# Patient Record
Sex: FEMALE | Race: WHITE | NOT HISPANIC OR LATINO | URBAN - METROPOLITAN AREA
[De-identification: names, ages, dates, MRNs, and addresses within clinical notes are randomized per-mention and may not be internally consistent; named-entity substitution may affect disease eponyms.]

---

## 2023-12-15 ENCOUNTER — APPOINTMENT (RX ONLY)
Dept: URBAN - METROPOLITAN AREA CLINIC 308 | Facility: CLINIC | Age: 68
Setting detail: DERMATOLOGY
End: 2023-12-15

## 2023-12-15 DIAGNOSIS — L57.0 ACTINIC KERATOSIS: ICD-10-CM

## 2023-12-15 DIAGNOSIS — I78.8 OTHER DISEASES OF CAPILLARIES: ICD-10-CM

## 2023-12-15 PROBLEM — D23.39 OTHER BENIGN NEOPLASM OF SKIN OF OTHER PARTS OF FACE: Status: ACTIVE | Noted: 2023-12-15

## 2023-12-15 PROCEDURE — 99203 OFFICE O/P NEW LOW 30 MIN: CPT | Mod: 25

## 2023-12-15 PROCEDURE — ? LIQUID NITROGEN

## 2023-12-15 PROCEDURE — 17000 DESTRUCT PREMALG LESION: CPT

## 2023-12-15 PROCEDURE — ? COUNSELING

## 2023-12-15 ASSESSMENT — LOCATION ZONE DERM
LOCATION ZONE: FACE
LOCATION ZONE: NOSE

## 2023-12-15 ASSESSMENT — LOCATION SIMPLE DESCRIPTION DERM
LOCATION SIMPLE: RIGHT FOREHEAD
LOCATION SIMPLE: NOSE

## 2023-12-15 ASSESSMENT — LOCATION DETAILED DESCRIPTION DERM
LOCATION DETAILED: NASAL DORSUM
LOCATION DETAILED: RIGHT INFERIOR MEDIAL FOREHEAD

## 2023-12-15 NOTE — PROCEDURE: COUNSELING
Detail Level: Zone
Sunscreen Recommendations: Broad-spectrum daily sunscreen with SPF 30 or higher. Ideally a brand with zinc oxide such as Elta MD or CeraVe AM.
Detail Level: Detailed
Detail Level: Simple